# Patient Record
Sex: FEMALE | Race: WHITE | NOT HISPANIC OR LATINO
[De-identification: names, ages, dates, MRNs, and addresses within clinical notes are randomized per-mention and may not be internally consistent; named-entity substitution may affect disease eponyms.]

---

## 2018-01-22 ENCOUNTER — TRANSCRIPTION ENCOUNTER (OUTPATIENT)
Age: 58
End: 2018-01-22

## 2021-12-01 PROBLEM — Z00.00 ENCOUNTER FOR PREVENTIVE HEALTH EXAMINATION: Status: ACTIVE | Noted: 2021-12-01

## 2021-12-16 ENCOUNTER — APPOINTMENT (OUTPATIENT)
Dept: ORTHOPEDIC SURGERY | Facility: CLINIC | Age: 61
End: 2021-12-16
Payer: COMMERCIAL

## 2021-12-16 VITALS — HEIGHT: 65 IN | RESPIRATION RATE: 16 BRPM | BODY MASS INDEX: 24.99 KG/M2 | WEIGHT: 150 LBS

## 2021-12-16 DIAGNOSIS — Z78.9 OTHER SPECIFIED HEALTH STATUS: ICD-10-CM

## 2021-12-16 PROCEDURE — 99203 OFFICE O/P NEW LOW 30 MIN: CPT | Mod: 25

## 2021-12-16 PROCEDURE — 20550 NJX 1 TENDON SHEATH/LIGAMENT: CPT

## 2021-12-16 PROCEDURE — 73110 X-RAY EXAM OF WRIST: CPT | Mod: 50

## 2022-02-17 ENCOUNTER — TRANSCRIPTION ENCOUNTER (OUTPATIENT)
Age: 62
End: 2022-02-17

## 2022-03-08 ENCOUNTER — NON-APPOINTMENT (OUTPATIENT)
Age: 62
End: 2022-03-08

## 2022-03-08 ENCOUNTER — APPOINTMENT (OUTPATIENT)
Dept: ORTHOPEDIC SURGERY | Facility: CLINIC | Age: 62
End: 2022-03-08
Payer: COMMERCIAL

## 2022-03-08 VITALS — BODY MASS INDEX: 24.99 KG/M2 | WEIGHT: 150 LBS | HEIGHT: 65 IN | RESPIRATION RATE: 16 BRPM

## 2022-03-08 DIAGNOSIS — M25.532 PAIN IN LEFT WRIST: ICD-10-CM

## 2022-03-08 PROCEDURE — 20550 NJX 1 TENDON SHEATH/LIGAMENT: CPT

## 2022-03-08 PROCEDURE — 99214 OFFICE O/P EST MOD 30 MIN: CPT | Mod: 25

## 2022-03-08 NOTE — HISTORY OF PRESENT ILLNESS
[Left] : left hand dominant [FreeTextEntry1] : Patient returns for follow up of left de Quervain's tenosynovitis with one previous injection on 12/16/21 which lasted 1.5 months. Patient would like a second injection today.

## 2022-03-08 NOTE — ASSESSMENT
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 I therefore recommended that the patient undergo a first dorsal compartment injection. The risks benefits and alternatives were discussed with the patient. This included (but was not limited to) subcutaneous atrophy, depigmentation, nerve injury, RSD, infection etc... the patient agreed and under informed consent and sterile conditions 1/2 cc of Kenalog and 1/2 cc of 2% plain lidocaine was precisely injected into the first dorsal compartment. It is my hope that this significantly alleviates the symptoms. Should the symptoms not disappear or should they recur then the patient should contact me. All questions were answered.\par \par Should it recur I recommend she call to schedule left de Quervain's release\par \par The risks benefits and alternatives were discussed including, but not limited to infection, nerve injury including radial sensory nerve and lateral antebrachial cutaneous nerve, tendon instability, persistent symptoms, scar tissue, skin adherence pain recurrence etc. Shared decision making was undertaken.

## 2022-03-08 NOTE — PHYSICAL EXAM
[de-identified] : Tender at first dorsal compartment with a positive Finkelstein's test.  Nontender at basal joint.